# Patient Record
Sex: FEMALE
[De-identification: names, ages, dates, MRNs, and addresses within clinical notes are randomized per-mention and may not be internally consistent; named-entity substitution may affect disease eponyms.]

---

## 2019-04-23 NOTE — PRE-OP HX & PHY REPO 2 SIG
DATE OF ADMISSION:  04/24/2019

DATE OF SERVICE:  04/23/2019



DATE OF ANTICIPATED SURGERY:  04/24/2019



PREOPERATIVE DIAGNOSIS:  Nonclearing vitreous hemorrhage, left eye.



BRIEF NOTE:  This is the second [place] admission for this patient who is a

very nice 73-year-old lady, who complained of dark vision in the left eye

over the past two months.  She was found to have a nonclearing vitreous

hemorrhage related to her diabetes.



PAST OCULAR HISTORY:  Remarkable for vitrectomy done on the right eye in

March 2015.  She has had bilateral retinal laser and previous Avastin

injections in both eyes.



The hemorrhage on the left has failed to clear with this previously

being her better eye.



PAST MEDICAL HISTORY:  Remarkable for diabetes for at least 18

years.



MEDICATIONS:  She is maintained on Janumet for her diabetes.  She also is

on glipizide.



ALLERGIES:  She has no known allergies.



PAST SURGICAL HISTORY:  She had a prior amputation in 2010.



OPHTHALMIC EXAMINATION:  Best vision at the time of admission was 20/300 in

the right eye and counting fingers in the left with pressures of 11 and

10.  The anterior segment showed sluggish reactions bilaterally.  There

was nuclear sclerosis in either lens.



Funduscopic exam of the right eye showed a broad pattern of panretinal

laser.  There was background retinopathy with significant edema.  The left

fundus showed optic disc neovascularization and a moderately dense

vitreous hemorrhage.  The retina appeared attached.  A broad pattern of

panretinal laser was seen.



ASSESSMENT:  Nonclearing vitreous hemorrhage, left eye.



PLAN:  The plan is to perform a pars plana vitrectomy with membrane

dissection as needed, extensive endolaser, and an Avastin injection.  The

risks and benefits of surgery were gone over with the patient with

potential infection, hemorrhage, glaucoma, cataract formation, and remote

possibility of loss of the eye.  The risk of anesthesia was also

discussed.  The patient understands and consents to the surgery, which

will be performed tomorrow morning.









  ______________________________________________

  Jose Burgess M.D.





DR:  Richy

D:  04/23/2019 10:55

T:  04/23/2019 14:54

JOB#:  [id number]

CC:

## 2019-04-24 ENCOUNTER — HOSPITAL ENCOUNTER (OUTPATIENT)
Dept: HOSPITAL 72 - SUR | Age: 74
Discharge: HOME | End: 2019-04-24
Payer: MEDICARE

## 2019-04-24 VITALS — DIASTOLIC BLOOD PRESSURE: 85 MMHG | SYSTOLIC BLOOD PRESSURE: 168 MMHG

## 2019-04-24 VITALS — DIASTOLIC BLOOD PRESSURE: 77 MMHG | SYSTOLIC BLOOD PRESSURE: 161 MMHG

## 2019-04-24 VITALS — SYSTOLIC BLOOD PRESSURE: 153 MMHG | DIASTOLIC BLOOD PRESSURE: 83 MMHG

## 2019-04-24 VITALS — SYSTOLIC BLOOD PRESSURE: 167 MMHG | DIASTOLIC BLOOD PRESSURE: 83 MMHG

## 2019-04-24 VITALS — BODY MASS INDEX: 28.23 KG/M2 | HEIGHT: 55 IN | WEIGHT: 122 LBS

## 2019-04-24 VITALS — DIASTOLIC BLOOD PRESSURE: 75 MMHG | SYSTOLIC BLOOD PRESSURE: 154 MMHG

## 2019-04-24 VITALS — SYSTOLIC BLOOD PRESSURE: 153 MMHG | DIASTOLIC BLOOD PRESSURE: 76 MMHG

## 2019-04-24 VITALS — SYSTOLIC BLOOD PRESSURE: 152 MMHG | DIASTOLIC BLOOD PRESSURE: 87 MMHG

## 2019-04-24 VITALS — SYSTOLIC BLOOD PRESSURE: 159 MMHG | DIASTOLIC BLOOD PRESSURE: 79 MMHG

## 2019-04-24 DIAGNOSIS — F31.9: ICD-10-CM

## 2019-04-24 DIAGNOSIS — F41.9: ICD-10-CM

## 2019-04-24 DIAGNOSIS — E11.9: ICD-10-CM

## 2019-04-24 DIAGNOSIS — Z79.899: ICD-10-CM

## 2019-04-24 DIAGNOSIS — H43.12: Primary | ICD-10-CM

## 2019-04-24 DIAGNOSIS — E11.319: ICD-10-CM

## 2019-04-24 DIAGNOSIS — Z95.5: ICD-10-CM

## 2019-04-24 DIAGNOSIS — K21.9: ICD-10-CM

## 2019-04-24 DIAGNOSIS — M19.90: ICD-10-CM

## 2019-04-24 DIAGNOSIS — I11.9: ICD-10-CM

## 2019-04-24 DIAGNOSIS — I25.10: ICD-10-CM

## 2019-04-24 LAB
ADD MANUAL DIFF: NO
ANION GAP SERPL CALC-SCNC: 6 MMOL/L (ref 5–15)
BASOPHILS NFR BLD AUTO: 1.2 % (ref 0–2)
BUN SERPL-MCNC: 34 MG/DL (ref 7–18)
CALCIUM SERPL-MCNC: 8.8 MG/DL (ref 8.5–10.1)
CHLORIDE SERPL-SCNC: 100 MMOL/L (ref 98–107)
CO2 SERPL-SCNC: 29 MMOL/L (ref 21–32)
CREAT SERPL-MCNC: 1.4 MG/DL (ref 0.55–1.3)
EOSINOPHIL NFR BLD AUTO: 4.6 % (ref 0–3)
ERYTHROCYTE [DISTWIDTH] IN BLOOD BY AUTOMATED COUNT: 13.4 % (ref 11.6–14.8)
HCT VFR BLD CALC: 32.2 % (ref 37–47)
HGB BLD-MCNC: 10.6 G/DL (ref 12–16)
LYMPHOCYTES NFR BLD AUTO: 26.6 % (ref 20–45)
MCV RBC AUTO: 89 FL (ref 80–99)
MONOCYTES NFR BLD AUTO: 9.6 % (ref 1–10)
NEUTROPHILS NFR BLD AUTO: 58.1 % (ref 45–75)
PLATELET # BLD: 139 K/UL (ref 150–450)
POTASSIUM SERPL-SCNC: 4.3 MMOL/L (ref 3.5–5.1)
RBC # BLD AUTO: 3.62 M/UL (ref 4.2–5.4)
SODIUM SERPL-SCNC: 135 MMOL/L (ref 136–145)
WBC # BLD AUTO: 7.8 K/UL (ref 4.8–10.8)

## 2019-04-24 PROCEDURE — 94150 VITAL CAPACITY TEST: CPT

## 2019-04-24 PROCEDURE — 82962 GLUCOSE BLOOD TEST: CPT

## 2019-04-24 PROCEDURE — 94003 VENT MGMT INPAT SUBQ DAY: CPT

## 2019-04-24 PROCEDURE — 36415 COLL VENOUS BLD VENIPUNCTURE: CPT

## 2019-04-24 PROCEDURE — 67039 LASER TREATMENT OF RETINA: CPT

## 2019-04-24 PROCEDURE — 85025 COMPLETE CBC W/AUTO DIFF WBC: CPT

## 2019-04-24 PROCEDURE — 67042 VIT FOR MACULAR HOLE: CPT

## 2019-04-24 PROCEDURE — 80048 BASIC METABOLIC PNL TOTAL CA: CPT

## 2019-04-24 RX ADMIN — CIPROFLOXACIN HYDROCHLORIDE SCH DROP: 3 SOLUTION/ DROPS OPHTHALMIC at 07:48

## 2019-04-24 RX ADMIN — FLURBIPROFEN SODIUM SCH DROP: 0.3 SOLUTION/ DROPS OPHTHALMIC at 07:49

## 2019-04-24 RX ADMIN — CYCLOPENTOLATE HYDROCHLORIDE SCH DROP: 10 SOLUTION OPHTHALMIC at 07:20

## 2019-04-24 RX ADMIN — CYCLOPENTOLATE HYDROCHLORIDE SCH DROP: 10 SOLUTION OPHTHALMIC at 07:48

## 2019-04-24 RX ADMIN — FLURBIPROFEN SODIUM SCH DROP: 0.3 SOLUTION/ DROPS OPHTHALMIC at 07:32

## 2019-04-24 RX ADMIN — PHENYLEPHRINE HYDROCHLORIDE SCH DROP: 25 SOLUTION/ DROPS OPHTHALMIC at 07:19

## 2019-04-24 RX ADMIN — CIPROFLOXACIN HYDROCHLORIDE SCH DROP: 3 SOLUTION/ DROPS OPHTHALMIC at 07:31

## 2019-04-24 RX ADMIN — PHENYLEPHRINE HYDROCHLORIDE SCH DROP: 25 SOLUTION/ DROPS OPHTHALMIC at 07:49

## 2019-04-24 RX ADMIN — CIPROFLOXACIN HYDROCHLORIDE SCH DROP: 3 SOLUTION/ DROPS OPHTHALMIC at 07:19

## 2019-04-24 RX ADMIN — FLURBIPROFEN SODIUM SCH DROP: 0.3 SOLUTION/ DROPS OPHTHALMIC at 07:20

## 2019-04-24 RX ADMIN — PHENYLEPHRINE HYDROCHLORIDE SCH DROP: 25 SOLUTION/ DROPS OPHTHALMIC at 07:32

## 2019-04-24 RX ADMIN — CYCLOPENTOLATE HYDROCHLORIDE SCH DROP: 10 SOLUTION OPHTHALMIC at 07:31

## 2019-04-24 NOTE — IMMEDIATE POST-OP EVALUATION
Immediate Post-Op Evalulation


Immediate Post-Op Evalulation


Procedure:  L eye PPV laser treatment avastin injection


Date of Evaluation:  Apr 24, 2019


Time of Evaluation:  08:56


IV Fluids:  300


Blood Products:  none


Estimated Blood Loss:  min


Urinary Output:  none


Blood Pressure Systolic:  156


Blood Pressure Diastolic:  78


Pulse Rate:  67


Respiratory Rate:  20


O2 Sat by Pulse Oximetry:  98


Temperature (Fahrenheit):  97.6


Pain Score (1-10):  1


Nausea:  No


Vomiting:  No


Complications


none


Patient Status:  awake, patent, none


Hydration Status:  adequate











Michel Tsai MD Apr 24, 2019 08:57

## 2019-04-24 NOTE — ANETHESIA PREOPERATIVE EVAL
Anesthesia Pre-op PMH/ROS


General


Date of Evaluation:  Apr 24, 2019


Time of Evaluation:  07:15


Anesthesiologist:  Quin


ASA Score:  ASA 3


Mallampati Score


Class I : Soft palate, uvula, fauces, pillars visible


Class II: Soft palate, uvula, fauces visible


Class III: Soft palate, base of uvula visible


Class IV: Only hard plate visible


Mallampati Classification:  Class II


Surgeon:  Aditya


Diagnosis:  Diabetic retinopathy


Surgical Procedure:  L eye PPV


Anesthesia History:  none


Family History:  no anesthesia problems


Allergies:  


Coded Allergies:  


     No Known Allergies (Unverified , 4/23/19)


Medications:  see eMAR


Patient NPO?:  Yes





Past Medical History


Cardiovascular:  Reports: HTN, CAD - recent angiogram with stent placement, 

other; 


   Denies: MI, valve dz, arrhythmia


Pulmonary:  Denies: asthma, COPD, CARMELITA, other


Gastrointestinal/Genitourinary:  Reports: GERD; 


   Denies: CRI, ESRD, other


Neurologic/Psychiatric:  Reports: depression/anxiety; 


   Denies: dementia, CVA, TIA, other


Endocrine:  Reports: DM - on pills ; 


   Denies: hypothyroidism, steroids, other


HEENT:  Reports: cataract (L), cataract (R), other - DM retinopathy; 


   Denies: glaucoma, [place] (L), [place] (R)


Hematology/Immune:  Reports: anemia - mild, bleeding disorder - anticoagulated; 


   Denies: DVT, other


Musculoskeletal/Integumentary:  Reports: OA; 


   Denies: RA, DJD, DDD, edema, other


PMH Narrative:


as above


PSxH Narrative:


See H&P





Anesthesia Pre-op Phys. Exam


Physician Exam


Constitutional:  NAD


Neurologic:  CN 2-12 intact


Cardiovascular:  RRR, no M/R/G


Respiratory:  CTA


Gastrointestinal:  S/NT/ND





Airway Exam


Mallampati Score:  Class II


MO:  limited


Neck:  stiff


ROM:  limited


Teeth:  missing


Dentures:  no upper, no lower





Anesthesia Pre-op A/P


Labs





Hematology








Test


  4/24/19


06:55


 


White Blood Count


  7.8 K/UL


(4.8-10.8)


 


Red Blood Count


  3.62 M/UL


(4.20-5.40)  L


 


Hemoglobin


  10.6 G/DL


(12.0-16.0)  L


 


Hematocrit


  32.2 %


(37.0-47.0)  L


 


Mean Corpuscular Volume 89 FL (80-99)  


 


Mean Corpuscular Hemoglobin


  29.2 PG


(27.0-31.0)


 


Mean Corpuscular Hemoglobin


Concent 32.8 G/DL


(32.0-36.0)


 


Red Cell Distribution Width


  13.4 %


(11.6-14.8)


 


Platelet Count


  139 K/UL


(150-450)  L


 


Mean Platelet Volume


  7.5 FL


(6.5-10.1)


 


Neutrophils (%) (Auto)


  58.1 %


(45.0-75.0)


 


Lymphocytes (%) (Auto)


  26.6 %


(20.0-45.0)


 


Monocytes (%) (Auto)


  9.6 %


(1.0-10.0)


 


Eosinophils (%) (Auto)


  4.6 %


(0.0-3.0)  H


 


Basophils (%) (Auto)


  1.2 %


(0.0-2.0)








Chemistry








Test


  4/24/19


06:55


 


Sodium Level


  135 MMOL/L


(136-145)  L


 


Potassium Level


  4.3 MMOL/L


(3.5-5.1)


 


Chloride Level


  100 MMOL/L


()


 


Carbon Dioxide Level


  29 MMOL/L


(21-32)


 


Anion Gap


  6 mmol/L


(5-15)


 


Blood Urea Nitrogen


  34 mg/dL


(7-18)  H


 


Creatinine


  1.4 MG/DL


(0.55-1.30)  H


 


Estimat Glomerular Filtration


Rate  mL/min (>60)  


 


 


Glucose Level


  124 MG/DL


()  H


 


Calcium Level


  8.8 MG/DL


(8.5-10.1)











Risk Assessment & Plan


Assessment:


ASA 3


Plan:


MAC with peribulbar block


Status Change Before Surgery:  No





Pre-Antibiotics


Drug:  none











Michel Tsai MD Apr 24, 2019 07:53

## 2019-04-24 NOTE — PRE-PROCEDURE NOTE/ATTESTATION
Pre-Procedure Note/Attestation


Complete Prior to Procedure


Planned Procedure:  left


Procedure Narrative:


PPV, membrane peel, endolaser, Avastin injection LEFT eye





Indications for Procedure


Pre-Operative Diagnosis:


Non-clearing vitreous hemorrhage Left eye





Attestation


I attest that I discussed the nature of the procedure; its benefits; risks and 

complications; and alternatives (and the risks and benefits of such alternatives

), prior to the procedure, with the patient (or the patient's legal 

representative).





I attest that, if there was a reasonable possibility of needing a blood 

transfusion, the patient (or the patient's legal representative) was given the 

[facility] standardized written summary, pursuant 

to the Jalil [employer] Blood Safety Act ([place] Health and Safety Code # 1645, as 

amended).





I attest that I re-evaluated the patient just prior to the surgery and that 

there has been no change in the patient's H&P, except as documented below:











Jose Burgess MD Apr 24, 2019 06:43

## 2019-04-24 NOTE — PRE-OP HX & PHY REPO 2 SIG
DATE OF ADMISSION:  2019

PRESURGICAL INTERNAL MEDICINE HISTORY AND PHYSICAL



REASON FOR EVALUATION:  I was asked by Dr. Jose Burgess to see this

73-year-old female, who is going for elective surgery on the left eye.

The patient has vitreous hemorrhage, left eye.  Please see History and

Physical by Dr. Jose Burgess.  The patient was evaluated.  Chart was

reviewed.



PAST MEDICAL HISTORY AND REVIEW OF SYSTEMS:  Remarkable for history of

hypertension, history of type 2 diabetes mellitus, history of coronary

heart disease.  No MI, but last month she had a coronary catheterization

with five stents according to son at bedside.  The patient denies history

of lung problem, asthma, bronchitis, or emphysema.  No history of stroke

or seizures.  Denies history of renal insufficiency.  No thyroid problem.

No history of anemia.  The patient has a history of degenerative joint

disease and uses a cane to ambulate.



PAST SURGICAL HISTORY:  Eye surgery in the past and coronary artery stent

placement.



FAMILY HISTORY:  Father  of old age of 103.  Mother unknown.



PRESENT MEDICATIONS:  Include Prandin and Coreg.  No history of taking

aspirin.



ALLERGIES:  Not known.



HABITS:  No history of smoking or alcohol habits.  No street drugs.



PHYSICAL EXAMINATION:

GENERAL:  Alert, well-developed, well-nourished female, in her 70s.

VITAL SIGNS:  Blood pressure 161/77, temperature 97, pulse 65 per minute

and regular, O2 saturation 97% on room air.

SKIN:  Clear and warm.  No rashes.  No ulcers.

LYMPH NODES:  Not enlarged.

HEENT:  Head, normocephalic.  Ears, clear.  Eyes, full description per Dr. Jose Burgess.  Mouth, clear and moist.  Absent of bottom teeth.  No

dentures.

NECK:  Supple.  No jugular venous distention.  Carotids artery +2.  Trachea

midline.

CHEST:  No deformity or asymmetry.

LUNGS:  Clear to auscultation and percussion.

HEART:  Sinus rhythm.  No ectopy.  No murmur.  No S3 or S4.

ABDOMEN:  Soft, benign.  Liver and spleen not enlarged.  No

rebound.

EXTREMITIES:  No edema.  Degenerative joint disease of the knee.  No

varicose vein.  No tenderness of the calf.

GENITOURINARY TRACT:  Normal for gender.  CVA nontender.

NERVOUS SYSTEM:  No tremor.  No nystagmus.



LABORATORY DATA:  ECG, pending.  High blood sugar 131 mg/dL.  The patient

is NPO since 6:00 p.m. yesterday.



IMPRESSION:

1. Vitreous hemorrhage, left eye.

2. Hypertension.

3. Diabetes mellitus, type 2.

4. Coronary heart disease.



PLAN:  _____ injection to the left eye per Dr. Jose Burgess.



CONCLUSION:  The patient has multiple medical problems including diabetes

mellitus, coronary heart disease, and hypertension.  The patient did not

eat anything from 6:00 p.m. yesterday.  Blood sugar this morning 131.  The

patient's condition optimized for surgery.



Thank you very much, Dr. Burgess, for privilege to participate in

presurgical care of this interesting patient.









  ______________________________________________

  Vincenzo Soares M.D.





DR:  RAHEEM

D:  2019 08:05

T:  2019 17:50

JOB#:  [id number]

CC:

## 2019-04-24 NOTE — BRIEF OPERATIVE NOTE
Immediate Post Operative Note


Operative Note


Chief Complaint:  Black clouds in vision Left eye


Pre-op Diagnosis:


Non-clearing vitreous hemorrhage Left eye


Procedure:


PPV, membrane peel, Endolaser 1138 spots, Avastin injection Left eye


Post-op Diagnosis:  same as pre-op


Surgeon:  susanna


Anesthesiologist:  Quin


Anesthesia:  MAC


Specimen:  none


Complications:  none


Condition:  stable


Fluids:  per anesthesia


Estimated Blood Loss:  none


Drains:  none


Implant(s) used?:  No











Jose Burgess MD Apr 24, 2019 08:54

## 2019-04-24 NOTE — 48 HOUR POST ANESTHESIA EVAL
Post Anesthesia Evaluation


Procedure:  L eye PPV laser treatment avastin injection


Date of Evaluation:  Apr 24, 2019


Time of Evaluation:  09:57


Blood Pressure Systolic:  148


0:  76


Pulse Rate:  68


Respiratory Rate:  20


Temperature (Fahrenheit):  97.5


O2 Sat by Pulse Oximetry:  97


Airway:  patent


Nausea:  No


Vomiting:  No


Pain Intensity:  1


Hydration Status:  adequate


Cardiopulmonary Status:


stable


Mental Status/LOC:  patient returned to baseline


Follow-up Care/Observations:


n/a


Post-Anesthesia Complications:


none


Follow-up care needed:  ready to discharge











Michel Tsai MD Apr 24, 2019 09:58

## 2019-04-25 NOTE — OPERATIVE NOTE - DICTATED
DATE OF OPERATION:  04/24/2019

PREOPERATIVE DIAGNOSIS:  Nonclearing vitreous hemorrhage, left eye.

 



POSTOPERATIVE DIAGNOSIS:  Nonclearing vitreous hemorrhage, left

eye.



PROCEDURES:

1. Pars plana vitrectomy.

2. Membrane peel.

3. Endolaser.

4. Avastin injection, left eye.



SURGEON:  Jose Burgess M.D.



ASSISTANT:  None.



ANESTHESIA:  Local with sedation.



ANESTHESIOLOGIST:  Michel Tsai M.D.



JUSTIFICATION FOR SURGERY:  This ______ -year-old lady developed dark

clouds ______ eye for the past two months and was found to have a

nonclearing hemorrhage.



BRIEF NOTE:  The patient was brought to the operating room, placed on

operating room table in supine position.  After a time-out was agreed upon

by the staff, and initial monitoring secured by Dr. Tsai, retrobulbar

and Van Lint blocks were given in the standard way to the left eye.  When

the blocks taken effect, she was prepped and draped in the normal manner.

A lid speculum inserted into the left eye.  Using a 23-gauge trocar

system, cannulas were placed all except infranasal quadrant.  Infusion

secured inferotemporally.  Vitrectomy was begun posterior to the lens

taking care to avoid contact.  A central core vitrectomy was done followed

by peripheral vitrectomy leaving a small vitreous skirt.  A larger surface

neovascularization and membrane formation was gently dissected and

elevated with the vitreous cutter and removed.  No bleeding was

encountered.  Scleral depression was done and no peripheral breaks, tears,

detachments were seen.  There were several areas of lightly treated retina

in far periphery.  The endolaser was then brought to the eye and a power

of 0.3 hansen, duration 0.2 seconds, a total of 1138 spots were applied in

a broad band peripheral to existing laser and surrounding the area of

neovascularization and traction inferonasally.  No problems were noted.



The eye was observed for bleeding and none was seen as a pressure was

lowered.



The superior cannulas were removed and the wounds noted to be

self-sealing.  Through the infusion cannula, Avastin 1.25 mg was injected.

The eye was reinflated and this cannula also removed.  This wound was

closed with a single suture of 8-0 Vicryl and the knots rotated.



Subconjunctival Decadron and gentamicin were then injected inferiorly

and Maxitrol ointment, Cyclogyl drops, moxifloxacin drops, and

prednisolone drops were instilled.  The eye was patched and shielded.  The

patient taken to recovery in excellent position.  There were no

complications.









  ______________________________________________

  Jose Burgess M.D.





DR:  Annette

D:  04/24/2019 09:04

T:  04/25/2019 18:06

JOB#:  [id number]

CC:  Jose Burgess M.D.; Fax#:  [phone]